# Patient Record
Sex: FEMALE | Race: WHITE
[De-identification: names, ages, dates, MRNs, and addresses within clinical notes are randomized per-mention and may not be internally consistent; named-entity substitution may affect disease eponyms.]

---

## 2018-05-16 ENCOUNTER — HOSPITAL ENCOUNTER (OUTPATIENT)
Dept: HOSPITAL 56 - MW.SDS | Age: 43
Discharge: HOME | End: 2018-05-16
Attending: SURGERY
Payer: COMMERCIAL

## 2018-05-16 DIAGNOSIS — Z79.899: ICD-10-CM

## 2018-05-16 DIAGNOSIS — D17.9: Primary | ICD-10-CM

## 2018-05-16 DIAGNOSIS — D17.39: ICD-10-CM

## 2018-05-16 DIAGNOSIS — E03.9: ICD-10-CM

## 2018-05-16 PROCEDURE — 21932 EXC BACK TUM DEEP < 5 CM: CPT

## 2018-05-16 PROCEDURE — 12032 INTMD RPR S/A/T/EXT 2.6-7.5: CPT

## 2018-05-16 PROCEDURE — 88304 TISSUE EXAM BY PATHOLOGIST: CPT

## 2018-05-16 PROCEDURE — 81025 URINE PREGNANCY TEST: CPT

## 2018-05-16 PROCEDURE — 11403 EXC TR-EXT B9+MARG 2.1-3CM: CPT

## 2018-05-16 NOTE — PCM.PREANE
Preanesthetic Assessment





- Anesthesia/Transfusion/Family Hx


Anesthesia History: Prior Anesthesia Without Reaction


Family History of Anesthesia Reaction: No


Transfusion History: No Prior Transfusion(s)





- Review of Systems


General: No Symptoms


Pulmonary: No Symptoms


Cardiovascular: No Symptoms


Gastrointestinal: No Symptoms


Neurological: No Symptoms


Other: Reports: None





- Physical Assessment


NPO Status Date: 05/15/18


Height: 1.73 m


Weight: 83.915 kg


ASA Class: 2


Mental Status: Alert & Oriented x3


Airway Class: Mallampati = 1


Dentition: Reports: Normal Dentition


ROM/Head Extension: Full


Lungs: Clear to Auscultation, Normal Respiratory Effort


Cardiovascular: Regular Rate, Regular Rhythm





- Lab


Values: 





 Laboratory Last Values











Urine HCG, Qual  NEGATIVE  (NEGATIVE)   18  07:25    














- Allergies


Allergies/Adverse Reactions: 


 Allergies











Allergy/AdvReac Type Severity Reaction Status Date / Time


 


No Known Allergies Allergy   Verified 18 07:41














- Anesthesia Plan


Pre-Op Medication Ordered: None





- Acknowledgements


Anesthesia Type Planned: MAC


Pt an Appropriate Candidate for the Planned Anesthesia: Yes


Alternatives and Risks of Anesthesia Discussed w Pt/Guardian: Yes


Pt/Guardian Understands and Agrees with Anesthesia Plan: Yes





PreAnesthesia Questionnaire


HEENT History: Reports: None


Gastrointestinal History: Reports: Other (See Below)


Other Gastrointestinal History: occasional heartburn


Genitourinary History: Reports: None


OB/GYN History: Reports: Endometriosis, Pregnancy


Endocrine/Metabolic History: Reports: Hypothyroidism





- Past Surgical History


Head Surgeries/Procedures: Reports: None


HEENT Surgical History: Reports: LASIK, Oral Surgery


Female  Surgical History: Reports:  Section, Hysterectomy


Other Female  Surgeries/Procedures: partial laparoscopic vaginal hysterectomy

, laparoscopies for endometriosis





- SUBSTANCE USE


Smoking Status *Q: Never Smoker


Recreational Drug Use History: No





- HOME MEDS


Home Medications: 


 Home Meds





Levothyroxine Sodium 137 mcg PO DAILY 18 [History]











- CURRENT (IN HOUSE) MEDS


Current Meds: 





 Current Medications





Hydrocodone Bitart/Acetaminophen (Norco 325-5 Mg)  1 tab PO Q4H PRN


   PRN Reason: Pain


Bupivacaine HCl/Epinephrine Bitart (Marcaine 0.25%/Epinephrine 1:200,000)  10 

ml INJECT ONETIME ONE


   Stop: 18 08:01


Cefazolin Sodium/Dextrose 2 gm (/ Premix)  50 mls @ 100 mls/hr IV ONETIME ONE


   Stop: 18 08:29


Lactated Ringer's (Ringers, Lactated)  1,000 mls @ 125 mls/hr IV ASDIRECTED TONY


   Last Admin: 18 07:33 Dose:  125 mls/hr





Discontinued Medications





Fentanyl (Sublimaze) Confirm Administered Dose 100 mcg .ROUTE .STK-MED ONE


   Stop: 18 07:33


Lidocaine (Xylocaine-Mpf 2%) Confirm Administered Dose 5 ml .ROUTE .STK-MED ONE


   Stop: 18 07:33


Midazolam HCl (Versed 1 Mg/Ml) Confirm Administered Dose 2 mg .ROUTE .STK-MED 

ONE


   Stop: 18 07:34


Propofol (Diprivan  20 Ml) Confirm Administered Dose 400 mg .ROUTE .STK-MED ONE


   Stop: 18 07:33

## 2018-05-16 NOTE — PCM.OPNOTE
- General Post-Op/Procedure Note


Date of Surgery/Procedure: 05/16/18


Operative Procedure(s): excision of left back intramuscular lipoma 4cm.  

excision of  right groin subcutaneous lipoma 3cm


Pre Op Diagnosis: back and groin lipomas


Post-Op Diagnosis: Same


Anesthesia Technique: Local, MAC


Primary Surgeon: Melissa Harden


Complications: None


Condition: Good

## 2018-05-23 NOTE — OR
SURGEON:

GILMER ZAVALETA MD

 

DATE OF PROCEDURE:  05/16/2018

 

PREOPERATIVE DIAGNOSIS:

Back and groin lipoma.

 

POSTOPERATIVE DIAGNOSIS:

Back and groin lipoma with intramuscular component of the back lipoma.

 

PROCEDURES:

1. Excision of left back intramuscular lipoma, 4 cm.

2. Excision of right groin subcutaneous lipoma, 3 cm.

 

ANESTHESIA:

Local MAC.

 

INDICATIONS:

Ms. Fenton is a 43-year-old female with several other lipoma.  These lesions

having been removed previously.  She presents with two that are bothersome to

her at this time.  The left back lipoma was intramuscular and quite deep and

thus elected to be taken care of in the operating room.  Risks and benefits of

this were discussed with her including, but not limited to, bleeding, infection,

damage to underlying or overlying structures, possible need for future

interventions, possible scarring.

 

PROCEDURE IN DETAIL:

After informed consent was obtained and placed on the chart, the patient was

brought to the operating theater and laid in left and right lateral decubitus

position.  After adequate local MAC anesthesia was obtained, the area was

prepped draped and a time-out was completed to confirm side and site.  0.25%

Marcaine with epinephrine was used into the area and a 15 blade was used to

dissect through the skin and Bovie to dissect through the subcutaneous tissues.

The deeper fascial layers were located and breached, and the lipoma was brought

into visualization.  Dissection was carried circumferentially, and there was a

small intramuscular component at the deep aspect of the lipoma.  Once freed from

the muscle, meticulous hemostasis was obtained of the muscle itself and the

lipoma was sent for pathology.

 

Attention was then paid to copious irrigation and closure using a 2-0 PDS

Stratafix for the fascia, 3-0 Stratafix for the skin, and a running subcuticular

for a total length of closure of 4 cm.  This was dressed with a Steri-Strip and

Tegaderm border.

 

Attention was then paid to the right groin lipoma and after repositioning,

prepping, draping, and adequate anesthesia, a 15 blade was used to dissect

through the skin and subcutaneous tissues, and the lipoma was easily removed in

the subcutaneous plane for a total length of 3 cm.  Once removed and sent for

pathology, the area was irrigated and no residual was appreciated and the skin

was closed using a 3-0 Monocryl stitch for the deep dermis, and a running 4-0

subcuticular for the skin, was dressed with Steri-Strips.  A Tegaderm border

used for outer dressing.  The patient tolerated this well.  All counts and

needles were correct at the end of the case.

 

FOLLOWUP INSTRUCTIONS:

The patient will see us in 10 to 14 days sooner if any problems, questions, or

concerns.  She was given a prescription for pain control if needed.

 

 

ZIYAD / RIC

DD:  05/23/2018 14:13:25

DT:  05/23/2018 16:15:32

Job #:  [id number]

## 2020-09-29 NOTE — EDM.PDOC
ED HPI GENERAL MEDICAL PROBLEM





- General


Chief Complaint: Respiratory Problem


Stated Complaint: REFER FROM [facility]


Time Seen by Provider: 20 16:11


Source of Information: Reports: Patient


History Limitations: Reports: No Limitations





- History of Present Illness


INITIAL COMMENTS - FREE TEXT/NARRATIVE: 





45F PMHx hypothyroidism presents from COVID clinic for concern for COVID infe

ction. Patient had positive exposure at a wedding roughly 2 weeks ago. For last 

10 days has "run through the gambit" of symptoms w/ dry cough, CP, SOB, 

abdominal pain, N/V/D, body aches, fever, and HA. Notes that fever, abdominal 

pain, N/V/D, body aches, HA all improving. She presented to COVID clinic today 

b/c of worsening SOB and chest tightness/pleuritic CP over last 2 days. Denies 

LE swelling/redness/pain. 





In COVID clinic apparently had O2 sats in low 80s prompting transition of care 

to ED. In ED patient satting mid 90s on RA. 





- Related Data


                                    Allergies











Allergy/AdvReac Type Severity Reaction Status Date / Time


 


No Known Allergies Allergy   Verified 18 07:41











Home Meds: 


                                    Home Meds





Levothyroxine Sodium 137 mcg PO DAILY 18 [History]


Acetaminophen/HYDROcodone [Norco 325-5 MG] 1 tab PO Q4H PRN #30 tablet 18 

[Rx]


Albuterol Sulfate 1.25 mg IH Q6H PRN #30 vial.neb 20 [Rx]


LORazepam [Ativan] 0.5 mg PO BEDTIME PRN #6 tab 20 [Rx]











Past Medical History


HEENT History: Reports: None


Gastrointestinal History: Reports: Other (See Below)


Other Gastrointestinal History: occasional heartburn


Genitourinary History: Reports: None


OB/GYN History: Reports: Endometriosis, Pregnancy


Endocrine/Metabolic History: Reports: Hypothyroidism





- Past Surgical History


Head Surgeries/Procedures: Reports: None


HEENT Surgical History: Reports: LASIK, Oral Surgery


Female  Surgical History: Reports:  Section, Hysterectomy


Other Female  Surgeries/Procedures: partial laparoscopic vaginal hysterectomy,

 laparoscopies for endometriosis





Social & Family History





- Tobacco Use


Smoking Status *Q: Never Smoker





- Recreational Drug Use


Recreational Drug Use: No





ED ROS GENERAL





- Review of Systems


Review Of Systems: Comprehensive ROS is negative, except as noted in HPI.





ED EXAM, GENERAL





- Physical Exam


Exam: See Below


Exam Limited By: No Limitations


General Appearance: Alert, WD/WN, No Apparent Distress


Nose: Normal Inspection


Throat/Mouth: Normal Inspection, Normal Voice, No Airway Compromise


Head: Atraumatic, Normocephalic


Neck: Normal Inspection


Respiratory/Chest: No Respiratory Distress, Lungs Clear, Normal Breath Sounds, 

No Accessory Muscle Use


Cardiovascular: Normal Peripheral Pulses, Regular Rate, Rhythm, No Edema


GI/Abdominal: Soft, Non-Tender


Extremities: Normal Inspection


Neurological: Alert


Psychiatric: Normal Affect, Normal Mood


Skin Exam: Warm, Dry, Intact





EKG INTERPRETATION


EKG Date: 20


Time: 16:16


Rhythm: NSR


Rate (Beats/Min): 80


Axis: Normal


P-Wave: Present


QRS: Normal


ST-T: Normal


QT: Normal


NC/PQ Interval: 138


Comparison: NA - No Prior EKG


EKG Interpretation Comments: 





no evidence of ischemia or right heart strain





Course





- Vital Signs


Last Recorded V/S: 


                                Last Vital Signs











Temp  98.2 F   20 16:17


 


Pulse  86   20 16:17


 


Resp  16   20 16:17


 


BP  121/59 L  20 16:17


 


Pulse Ox  97   20 16:30














- Orders/Labs/Meds


Orders: 


                               Active Orders 24 hr











 Category Date Time Status


 


 EKG Documentation Completion [RC] STAT Care  20 16:23 Active


 


 Chest 1V Frontal [CR] Stat Exams  20 16:24 Ordered











Labs: 


                                Laboratory Tests











  20 Range/Units





  16:13 16:13 16:13 


 


WBC  6.55    (4.0-11.0)  K/uL


 


RBC  4.21 L    (4.30-5.90)  M/uL


 


Hgb  13.3    (12.0-16.0)  g/dL


 


Hct  40.6    (36.0-46.0)  %


 


MCV  96.4    (80.0-98.0)  fL


 


MCH  31.6    (27.0-32.0)  pg


 


MCHC  32.8    (31.0-37.0)  g/dL


 


RDW Std Deviation  44.5    (28.0-62.0)  fl


 


RDW Coeff of Paolo  13    (11.0-15.0)  %


 


Plt Count  183    (150-400)  K/uL


 


MPV  11.80    (7.40-12.00)  fL


 


Neut % (Auto)  60.9    (48.0-80.0)  %


 


Lymph % (Auto)  27.8    (16.0-40.0)  %


 


Mono % (Auto)  11.1    (0.0-15.0)  %


 


Eos % (Auto)  0.2    (0.0-7.0)  %


 


Baso % (Auto)  0.0    (0.0-1.5)  %


 


Neut # (Auto)  4.0    (1.4-5.7)  K/uL


 


Lymph # (Auto)  1.8    (0.6-2.4)  K/uL


 


Mono # (Auto)  0.7    (0.0-0.8)  K/uL


 


Eos # (Auto)  0.0    (0.0-0.7)  K/uL


 


Baso # (Auto)  0.0    (0.0-0.1)  K/uL


 


Nucleated RBC %  0.0    /100WBC


 


Nucleated RBCs #  0    K/uL


 


INR     


 


APTT     (18.6-31.3)  SEC


 


D-Dimer, Quantitative     (0.0-0.50)  mg/L FEU


 


Lactate   1.1   (0.20-2.00)  mmol/L


 


Sodium    138  (136-145)  mmol/L


 


Potassium    3.8  (3.5-5.1)  mmol/L


 


Chloride    103  ()  mmol/L


 


Carbon Dioxide    26.2  (21.0-32.0)  mmol/L


 


BUN    6 L  (7.0-18.0)  mg/dL


 


Creatinine    0.6  (0.6-1.0)  mg/dL


 


Est Cr Clr Drug Dosing    121.96  mL/min


 


Estimated GFR (MDRD)    > 60.0  ml/min


 


Glucose    105  ()  mg/dL


 


Calcium    8.6  (8.5-10.1)  mg/dL


 


Magnesium    2.2  (1.8-2.4)  mg/dL


 


Total Bilirubin    0.3  (0.2-1.0)  mg/dL


 


AST    35  (15-37)  IU/L


 


ALT    43  (14-63)  IU/L


 


Alkaline Phosphatase    81  ()  U/L


 


Troponin I    < 0.050  (0.000-0.056)  ng/mL


 


C-Reactive Protein    5.00 H  (0.00-0.90)  mg/dL


 


Total Protein    7.5  (6.4-8.2)  g/dL


 


Albumin    3.6  (3.4-5.0)  g/dL


 


Globulin    3.9  (2.6-4.0)  g/dL


 


Albumin/Globulin Ratio    0.9  (0.9-1.6)  


 


HCG, Qual     (NEG)  


 


SARS CoV-2 RNA Rapid ZHANNA     (NEGATIVE)  














  20 Range/Units





  16:13 16:13 16:32 


 


WBC     (4.0-11.0)  K/uL


 


RBC     (4.30-5.90)  M/uL


 


Hgb     (12.0-16.0)  g/dL


 


Hct     (36.0-46.0)  %


 


MCV     (80.0-98.0)  fL


 


MCH     (27.0-32.0)  pg


 


MCHC     (31.0-37.0)  g/dL


 


RDW Std Deviation     (28.0-62.0)  fl


 


RDW Coeff of Paolo     (11.0-15.0)  %


 


Plt Count     (150-400)  K/uL


 


MPV     (7.40-12.00)  fL


 


Neut % (Auto)     (48.0-80.0)  %


 


Lymph % (Auto)     (16.0-40.0)  %


 


Mono % (Auto)     (0.0-15.0)  %


 


Eos % (Auto)     (0.0-7.0)  %


 


Baso % (Auto)     (0.0-1.5)  %


 


Neut # (Auto)     (1.4-5.7)  K/uL


 


Lymph # (Auto)     (0.6-2.4)  K/uL


 


Mono # (Auto)     (0.0-0.8)  K/uL


 


Eos # (Auto)     (0.0-0.7)  K/uL


 


Baso # (Auto)     (0.0-0.1)  K/uL


 


Nucleated RBC %     /100WBC


 


Nucleated RBCs #     K/uL


 


INR  0.97    


 


APTT  25.5    (18.6-31.3)  SEC


 


D-Dimer, Quantitative  0.33    (0.0-0.50)  mg/L FEU


 


Lactate     (0.20-2.00)  mmol/L


 


Sodium     (136-145)  mmol/L


 


Potassium     (3.5-5.1)  mmol/L


 


Chloride     ()  mmol/L


 


Carbon Dioxide     (21.0-32.0)  mmol/L


 


BUN     (7.0-18.0)  mg/dL


 


Creatinine     (0.6-1.0)  mg/dL


 


Est Cr Clr Drug Dosing     mL/min


 


Estimated GFR (MDRD)     ml/min


 


Glucose     ()  mg/dL


 


Calcium     (8.5-10.1)  mg/dL


 


Magnesium     (1.8-2.4)  mg/dL


 


Total Bilirubin     (0.2-1.0)  mg/dL


 


AST     (15-37)  IU/L


 


ALT     (14-63)  IU/L


 


Alkaline Phosphatase     ()  U/L


 


Troponin I     (0.000-0.056)  ng/mL


 


C-Reactive Protein     (0.00-0.90)  mg/dL


 


Total Protein     (6.4-8.2)  g/dL


 


Albumin     (3.4-5.0)  g/dL


 


Globulin     (2.6-4.0)  g/dL


 


Albumin/Globulin Ratio     (0.9-1.6)  


 


HCG, Qual   NEGATIVE   (NEG)  


 


SARS CoV-2 RNA Rapid ZHANNA    POSITIVE H  (NEGATIVE)  














- Re-Assessments/Exams


Free Text/Narrative Re-Assessment/Exam: 





20 16:35


Will get labs/EKG/CXR. Will get D-dimer to screen for PE. Will continue 

monitoring oxygen very closely but will defer supplemental O2 in setting of 

normal O2 sats on RA at this time. Normal HR and no fever; patient denies 

antipyretic use for last 2 days as fever/body aches all improving. 


20 17:14


Labs grossly unremarkable; d-dimer negative. CXR unremarkable. WIll d/c with 

COVID return precautions. 





Departure





- Departure


Time of Disposition: 17:17


Disposition: Home, Self-Care 01


Condition: Good


Clinical Impression: 


 COVID-19








- Discharge Information


Prescriptions: 


LORazepam [Ativan] 0.5 mg PO BEDTIME PRN #6 tab


 PRN Reason: Anxiety


Instructions:  COVID-19: How to Protect Yourself and Others - CDC, COVID-19 

Frequently Asked Questions


Referrals: 


Javed Johnson MD [Primary Care Provider] - 


Forms:  ED Department Discharge


Additional Instructions: 


The following information is given to patients seen in the emergency department 

who are being discharged to home. This information is to outline your options 

for follow-up care. We provide all patients seen in our emergency department 

with a follow-up referral.


The need for follow-up, as well as the timing and circumstances, are variable 

depending upon the specifics of your emergency department visit.


If you don't have a primary care physician on staff, we will provide you with a 

referral. We always advise you to contact your personal physician following an 

emergency department visit to inform them of the circumstance of the visit and 

for follow-up with them and/or the need for any referrals to a consulting 

specialist.


The emergency department will also refer you to a specialist when appropriate. 

This referral assures that you have the opportunity for follow-up care with a 

specialist. All of these measure are taken in an effort to provide you with 

optimal care, which includes your follow-up.


Under all circumstances we always encourage you to contact your private 

physician who remains a resource for coordinating your care. When calling for 

follow-up care, please make the office aware that this follow-up is from your 

recent emergency room visit. If for any reason you are refused follow-up, please

contact the [facility] Emergency Department

at [phone] and asked to speak to the emergency department charge nurse.


Please follow up with your primary care physician. If you do not have a primary 

care physician, see below:


[facility]


[address] [place] [id number] [phone]





[facility]


[address] [place] [id number] [phone]





Sepsis Event Note (ED)





- Evaluation


Sepsis Screening Result: No Definite Risk





- Focused Exam


Vital Signs: 


                                   Vital Signs











  Temp Pulse Resp BP Pulse Ox


 


 20 16:30      97


 


 20 16:17  98.2 F  86  16  121/59 L  99














- My Orders


Last 24 Hours: 


My Active Orders





20 16:23


EKG Documentation Completion [RC] STAT 





20 16:24


Chest 1V Frontal [CR] Stat 














- Assessment/Plan


Last 24 Hours: 


My Active Orders





20 16:23


EKG Documentation Completion [RC] STAT 





20 16:24


Chest 1V Frontal [CR] Stat

## 2020-09-29 NOTE — CR
Chest: Portable view of the chest was obtained.

 

Comparison: No prior chest imaging.

 

Slight parenchymal density within left upper chest is seen.  Mild 

atelectasis is noted within left lower lung.  Lungs otherwise are 

clear.  Heart size and mediastinum are normal.  Bony structures are 

unremarkable.

 

Impression:

1.  Probable small area of pneumonia within left upper chest.

2.  Left basilar atelectasis is seen.

 

Diagnostic code #3

 

This report was dictated in MDT